# Patient Record
Sex: MALE | Race: WHITE | NOT HISPANIC OR LATINO
[De-identification: names, ages, dates, MRNs, and addresses within clinical notes are randomized per-mention and may not be internally consistent; named-entity substitution may affect disease eponyms.]

---

## 2022-04-05 PROBLEM — Z00.00 ENCOUNTER FOR PREVENTIVE HEALTH EXAMINATION: Status: ACTIVE | Noted: 2022-04-05

## 2022-04-13 ENCOUNTER — NON-APPOINTMENT (OUTPATIENT)
Age: 52
End: 2022-04-13

## 2022-04-18 ENCOUNTER — NON-APPOINTMENT (OUTPATIENT)
Age: 52
End: 2022-04-18

## 2022-04-20 ENCOUNTER — APPOINTMENT (OUTPATIENT)
Dept: COLORECTAL SURGERY | Facility: CLINIC | Age: 52
End: 2022-04-20
Payer: MEDICAID

## 2022-04-20 VITALS
HEART RATE: 94 BPM | SYSTOLIC BLOOD PRESSURE: 133 MMHG | TEMPERATURE: 97.9 F | DIASTOLIC BLOOD PRESSURE: 83 MMHG | WEIGHT: 279 LBS | HEIGHT: 72 IN | BODY MASS INDEX: 37.79 KG/M2

## 2022-04-20 PROCEDURE — 99204 OFFICE O/P NEW MOD 45 MIN: CPT | Mod: 25

## 2022-04-20 PROCEDURE — 46600 DIAGNOSTIC ANOSCOPY SPX: CPT

## 2022-04-20 NOTE — HISTORY OF PRESENT ILLNESS
[FreeTextEntry1] : 50 yo M presents for evaluation of anal fissure\par PMH HTN, h/o COVID infection c/b PE, treated w/ Eliquis, discontinued as of 4-5 months ago\par PSH Cholecystectomy\par \par h/o prolapsing hemorrhoids that used to reduce on own, however 2 months pain worsened during BMs and lasted hours after BMs. Pt seen by PCP who referred to CRS Dr. Tonie Lares at [facility] who started on Diltiazem 2% BID for the last 6 weeks w/ improvement in pain. Seen by Dr. Lares for follow up who recommended surgery to address hemorrhoids and fissure. Pt's friend recommended second opinion w/ this office prior to considering surgery\par \par Reports overall improvement in symptoms w/ reduced severity of pain after BMs. Reports occasional BRB noted on TP and paper\par Admits to worsening burning/itching which is worsened if hemorrhoids prolapse out. \par Not using any other topical creams. Denies prior h/o hemorrhoidal procedure\par Not performing sitz baths at present\par \par BH: daily to every other day due to fear of pain\par Denies constipation, straining or diarrhea\par Denies use of stool softeners or fiber supplements\par \par Last colonoscopy w/ Dr. Vanessa at [facility], otherwise normal, no specimens collected. Repeat in 10 years\par Denies FMH colorectal CA or IBD. Father w/ lung CA\par Denies ASA use. Possibly took Motrin for headache in the last 7 days\par \par

## 2022-04-20 NOTE — PHYSICAL EXAM
[Posterior] : posteriorly [Normal] : was normal [None] : there was no rectal mass  [de-identified] : Broad-based posterior fissure.  Partially epithelialized. [FreeTextEntry1] : Medical assistant was present for the entire exam.\par \par Anoscopy was performed for evaluation of the patients rectal bleeding  history .\par The risks, benefits and alternatives were reviewed.\par \par A lighted anoscope was passed into the anal canal and the entire anal mucosal surface was inspected..  \par The findings revealed large internal hemorrhoids.\par No masses or lesions were identified.\par \par

## 2022-04-20 NOTE — ASSESSMENT
[FreeTextEntry1] : I reviewed with the patient the findings on examination consistent with a posterior anal fissure and large internal hemorrhoids.  Overall his symptoms of post defecatory pain and burning are improving with conservative management–treatment of anal fissure.  I have therefore counseled him regarding the need for continued efforts to soften his stool.  There is potential opportunity to avoid surgery if his symptoms continue to improve and a period of additional 6 weeks of conservative management would be justified.\par \par In regard to his internal hemorrhoids–I have counseled him regarding the need for continued fiber supplementation and increase hydration.  If his fissure resolves with conservative management attempts at rubber band ligation of his internal hemorrhoids would be appropriate first choice.\par \par The risks and befits of the treatment plan were reviewed and outlined with the patient. The patient understands the associated risks of the involved treatment and wishes to proceed. All questions were answered and explained.\par \par

## 2023-01-29 ENCOUNTER — NON-APPOINTMENT (OUTPATIENT)
Age: 53
End: 2023-01-29

## 2023-01-30 ENCOUNTER — APPOINTMENT (OUTPATIENT)
Dept: COLORECTAL SURGERY | Facility: CLINIC | Age: 53
End: 2023-01-30
Payer: MEDICAID

## 2023-01-30 VITALS
HEIGHT: 72 IN | SYSTOLIC BLOOD PRESSURE: 148 MMHG | HEART RATE: 91 BPM | DIASTOLIC BLOOD PRESSURE: 95 MMHG | TEMPERATURE: 97.2 F | WEIGHT: 282 LBS | BODY MASS INDEX: 38.19 KG/M2

## 2023-01-30 PROCEDURE — 99214 OFFICE O/P EST MOD 30 MIN: CPT

## 2023-01-30 NOTE — ASSESSMENT
[FreeTextEntry1] : I had an extensive discussion with the patient (30 minutes) regarding the diagnosis of an anal fissure. The etiology is suspected to be related to a hypertonic sphincter as well as secondary direct anal trauma. The medical and surgical management options have been reviewed in detail including lubricant suppository therapy, stool softeners, fiber agents, and surgical anal dilatation. The risks, benefits and alternatives including bleeding infection, nonhealing wounds, and permanent leakage, seepage and incontinence have been detailed. All questions have been reviewed and answered. Appropriate literature has been shared with the patient.\par Patient wishes to proceed with surgery for his anal fissure–anal dilation.  All questions answered.\par \par Surgery scheduled

## 2023-01-30 NOTE — HISTORY OF PRESENT ILLNESS
[FreeTextEntry1] : 51 y/o M presents for follow up evaluation of anal fissure \par PMH HTN, h/o COVID infection c/b PE, treated w/ Eliquis, discontinued Dec 2021/Jan 2022\par PSH Cholecystectomy\par \par Seen for initial consultation 4/20/22, Second opinion for h/o prolapsing hemorrhoids that use to reduce on their own. However, patient had flare of pain after BMs. Patient seen by Dr. Tonie Lares at [facility] began pt on Diltiazem 2% BID. Symptoms improved and during follow up appt pt was recommended surgery to address hemorrhoids and fissure  \par \par Pt c/o burning/itching which worsens when hemorrhoids prolapse. On exam, anal fissure posteriorly, broad based posterior fissure. Partially epithelized. Sphincter tone was normal. No rectal tenderness, anoscopy revealed, large internal hemorrhoids. Supportive measures reviewed, and pt counseled on possible anal fissure symptomatology improvement ~ 6 weeks. Encouraged pt on bowl optimization habits to alleviate hemorrhoidal symptoms. \par \par c/o persistent symptoms w/ burning sensation after BMs, hemorrhoids prolapse w/ every BM which he engages muscles to "suck in hemorrhoids." Pt uses topical compound as needed for pain. Reports symptoms interfere w/ work schedule, he "schedules" BMs every other day in the afternoon to get through work day\par Rare BRB on TP when he wipes or may drip into the bowl, occurs approx every 1-2 months\par BH: every other day, occasional straining as he avoids BM. Admits to sitting on toilet for prolonged time and often reads emails\par Takes stool softener once daily\par Denies ASA/NSAID use\par \par Last colonoscopy w/ Dr. Vanessa at [facility], otherwise normal, no specimens collected. Repeat in 10 years\par Denies FMH colorectal CA or IBD. Father w/ lung CA

## 2023-03-15 ENCOUNTER — TRANSCRIPTION ENCOUNTER (OUTPATIENT)
Age: 53
End: 2023-03-15

## 2023-03-15 NOTE — ASU PATIENT PROFILE, ADULT - NSICDXPASTMEDICALHX_GEN_ALL_CORE_FT
PAST MEDICAL HISTORY:  High blood pressure     Pancreatitis     Pneumonia of both lungs due to Covid 19 with blood cloth 2020

## 2023-03-15 NOTE — ASU PATIENT PROFILE, ADULT - FALL HARM RISK - UNIVERSAL INTERVENTIONS
Bed in lowest position, wheels locked, appropriate side rails in place/Call bell, personal items and telephone in reach/Instruct patient to call for assistance before getting out of bed or chair/Non-slip footwear when patient is out of bed/[place] to call system/Physically safe environment - no spills, clutter or unnecessary equipment/Purposeful Proactive Rounding/Room/bathroom lighting operational, light cord in reach

## 2023-03-15 NOTE — ASU PATIENT PROFILE, ADULT - NS PREOP UNDERSTANDS INFO
No solid food or milk products after midnight, 03-16-23 Water, clear apple juice or Gatorade no later than 05:30 am DOS/ Photo ID and Insurance card/Comfortable clothing/Adult escort to take you home/yes

## 2023-03-16 ENCOUNTER — TRANSCRIPTION ENCOUNTER (OUTPATIENT)
Age: 53
End: 2023-03-16

## 2023-03-16 ENCOUNTER — OUTPATIENT (OUTPATIENT)
Dept: OUTPATIENT SERVICES | Facility: HOSPITAL | Age: 53
LOS: 1 days | Discharge: HOME | End: 2023-03-16
Payer: MEDICAID

## 2023-03-16 ENCOUNTER — APPOINTMENT (OUTPATIENT)
Dept: COLORECTAL SURGERY | Facility: HOSPITAL | Age: 53
End: 2023-03-16

## 2023-03-16 VITALS
RESPIRATION RATE: 15 BRPM | DIASTOLIC BLOOD PRESSURE: 80 MMHG | HEIGHT: 72 IN | SYSTOLIC BLOOD PRESSURE: 132 MMHG | TEMPERATURE: 97 F | WEIGHT: 273.81 LBS | OXYGEN SATURATION: 98 % | HEART RATE: 87 BPM

## 2023-03-16 VITALS — SYSTOLIC BLOOD PRESSURE: 99 MMHG | OXYGEN SATURATION: 97 % | DIASTOLIC BLOOD PRESSURE: 51 MMHG | HEART RATE: 63 BPM

## 2023-03-16 DIAGNOSIS — G89.18 OTHER ACUTE POSTPROCEDURAL PAIN: ICD-10-CM

## 2023-03-16 DIAGNOSIS — Z90.49 ACQUIRED ABSENCE OF OTHER SPECIFIED PARTS OF DIGESTIVE TRACT: Chronic | ICD-10-CM

## 2023-03-16 PROCEDURE — 45905 DILATION OF ANAL SPHINCTER: CPT | Mod: GC

## 2023-03-16 RX ORDER — DOCUSATE SODIUM 100 MG
1 CAPSULE ORAL
Qty: 14 | Refills: 0
Start: 2023-03-16 | End: 2023-03-29

## 2023-03-16 RX ORDER — SODIUM CHLORIDE 9 MG/ML
1000 INJECTION, SOLUTION INTRAVENOUS
Refills: 0 | Status: DISCONTINUED | OUTPATIENT
Start: 2023-03-16 | End: 2023-03-16

## 2023-03-16 RX ORDER — SODIUM CHLORIDE 9 MG/ML
1000 INJECTION, SOLUTION INTRAVENOUS ONCE
Refills: 0 | Status: DISCONTINUED | OUTPATIENT
Start: 2023-03-16 | End: 2023-03-16

## 2023-03-16 RX ORDER — ACETAMINOPHEN 500 MG
650 TABLET ORAL ONCE
Refills: 0 | Status: DISCONTINUED | OUTPATIENT
Start: 2023-03-16 | End: 2023-03-16

## 2023-03-16 RX ORDER — OXYCODONE AND ACETAMINOPHEN 5; 325 MG/1; MG/1
1 TABLET ORAL
Qty: 16 | Refills: 0
Start: 2023-03-16 | End: 2023-03-19

## 2023-03-16 NOTE — ASU DISCHARGE PLAN (ADULT/PEDIATRIC) - ASU DC SPECIAL INSTRUCTIONSFT
Please follow up with Dr. Padilla in 1 week, please call to schedule an appointment.   Please follow instructions given to you by Dr. Padilla and his office.    Activity: Please resume normal activities such as walking, climbing stairs, and other activities of daily living. Refrain from intense exercise for at least 1 week.     Diet: please continue with high fiber diet.     Bleeding: Expect some bleeding after surgery. Spotting, dripping into toilet bowl, staining on your dressing - these are all expected up to a week or two after surgery.     Dressings: please keep in place for 1 day or until after your first bowel movement, whichever comes first. A piece of surgicell (cotton strip) has been placed within the anus, it will dissolve on its own and helps stop postoperative bleeding.     Sitz baths: please continue with sitz baths in a warm tub to help with pain and discomfort. Each soak should be for 15-20 minutes in a warm bath. You may repeat as many times as necessary.      Bowel movements: Please take colace 100mg twice daily for 2 weeks to facilitate solid, consistent bowel movements.     Medications: Please resume home medications. Please continue to hold blood thinners for 48 hours after the surgery (ex: aspirin, plavix), you may resume after 2 days. Do not place anything in the anus.     New medications:  You have been sent the following prescriptions to your pharmacy:  1. Percocet 325mg-5mg every 6 hours as needed for severe pain. please do not take Tylenol while taking percocet as it contains Tylenol.   2. Colace 100mg - please take 1-2 times daily while taking percocet to prevent constipation.     General Discharge Instructions:  Please resume all regular home medications unless specifically advised not to take a particular medication. Also, please take any new medications as prescribed.  Please get plenty of rest, continue to ambulate several times per day, and drink adequate amounts of fluids. Avoid lifting weights greater than 5-10 lbs until you follow-up with your surgeon, who will instruct you further regarding activity restrictions.  Avoid driving or operating heavy machinery while taking pain medications.  Please follow-up with your surgeon and Primary Care Provider (PCP) as advised.    Incision Care:  *Please call your doctor or nurse practitioner if you have increased pain, swelling, redness, or drainage from the incision site.  *Avoid swimming and baths until your follow-up appointment.  *You may shower, and wash surgical incisions with a mild soap and warm water. Gently pat the area dry.    Warning Signs:  Please call your doctor or nurse practitioner if you experience the following:  *You experience new chest pain, pressure, squeezing or tightness.  *New or worsening cough, shortness of breath, or wheeze.  *If you are vomiting and cannot keep down fluids or your medications.  *You are getting dehydrated due to continued vomiting, diarrhea, or other reasons. Signs of dehydration include dry mouth, rapid heartbeat, or feeling dizzy or faint when standing.  *You see blood or dark/black material when you vomit or have a bowel movement.  *You experience burning when you urinate, have blood in your urine, or experience a discharge.  *Your pain is not improving within 8-12 hours or is not gone within 24 hours. Call or return immediately if your pain is getting worse, changes location, or moves to your chest or back.  *You have shaking chills, or fever greater than 101.5 degrees Fahrenheit or 38 degrees Celsius.  *Any change in your symptoms, or any new symptoms that concern you.

## 2023-03-16 NOTE — PRE-ANESTHESIA EVALUATION ADULT - NSANTHOSAYNRD_GEN_A_CORE
No. DAVY screening performed.  STOP BANG Legend: 0-2 = LOW Risk; 3-4 = INTERMEDIATE Risk; 5-8 = HIGH Risk

## 2023-03-16 NOTE — ASU DISCHARGE PLAN (ADULT/PEDIATRIC) - CARE PROVIDER_API CALL
Lamin Padilla)  ColonRectal Surgery; Surgery  [facility], [address]nd [place] [address]  Phone: [phone]  Fax: [phone]  Follow Up Time: 1 week

## 2023-03-16 NOTE — ASU DISCHARGE PLAN (ADULT/PEDIATRIC) - NS MD DC FALL RISK RISK
For information on Fall & Injury Prevention, visit: [email or web address] OR  [email or web address] OR  https://www.cdc.gov/steadi/patient.html

## 2023-03-16 NOTE — BRIEF OPERATIVE NOTE - OPERATION/FINDINGS
Patient prepped and draped in sterile fashion. Local anesthetic injected circum-anally. Exam performed under anesthesia, noting anal fissure in posterior midline, multiple right hemorrhoids. Deedee's dilators were introduced into the anal canal, increasing size with each placement. Bovie electrocautery used for hemostasis of fissure, adequate hemostasis achieved.

## 2023-03-20 ENCOUNTER — TRANSCRIPTION ENCOUNTER (OUTPATIENT)
Age: 53
End: 2023-03-20

## 2023-04-14 ENCOUNTER — APPOINTMENT (OUTPATIENT)
Dept: COLORECTAL SURGERY | Facility: CLINIC | Age: 53
End: 2023-04-14
Payer: MEDICAID

## 2023-04-14 VITALS
DIASTOLIC BLOOD PRESSURE: 82 MMHG | BODY MASS INDEX: 38.06 KG/M2 | TEMPERATURE: 98.2 F | HEIGHT: 72 IN | WEIGHT: 281 LBS | HEART RATE: 78 BPM | SYSTOLIC BLOOD PRESSURE: 138 MMHG

## 2023-04-14 DIAGNOSIS — K60.2 ANAL FISSURE, UNSPECIFIED: ICD-10-CM

## 2023-04-14 PROBLEM — K85.90 ACUTE PANCREATITIS WITHOUT NECROSIS OR INFECTION, UNSPECIFIED: Chronic | Status: ACTIVE | Noted: 2023-03-15

## 2023-04-14 PROBLEM — I10 ESSENTIAL (PRIMARY) HYPERTENSION: Chronic | Status: ACTIVE | Noted: 2023-03-15

## 2023-04-14 PROBLEM — J18.9 PNEUMONIA, UNSPECIFIED ORGANISM: Chronic | Status: ACTIVE | Noted: 2023-03-15

## 2023-04-14 PROCEDURE — 99213 OFFICE O/P EST LOW 20 MIN: CPT

## 2023-04-14 NOTE — HISTORY OF PRESENT ILLNESS
[FreeTextEntry1] : 53 y/o M presents for post operative evaluation of anal fissure \par PMH HTN, h/o COVID infection c/b PE, treated w/ Eliquis, discontinued Dec 2021/Jan 2022\par PSH Cholecystectomy\par \par Seen for initial consultation 4/20/22, c/o burning itching and symptoms worsen when hemorrhoids prolapse. Exam notable for anal fissure posteriorly, broad based posterior fissure. Partially epithelized. Sphincter tone was normal. On anoscopy, large internal hemorrhoids were appreciated. Trial of supportive therapy advised, however, given persistent symptoms patient wished to have anal fissure addressed. \par \par S/p Anoscopy, and anal dilation 3/16/23\par \par Patient presents for post operative evaluation\par \par Pt reports fissure symptoms resolved since procedure. Had one bloody BM immediately after surgery, but otherwise didn't have any pain or bleeding since. \par Reports yesterday and today had BRB on TP after BM\par Has been taking 2 stool softeners BID, has daily BM and no longer sitting for long periods.\par Admits to hemorrhoidal swelling after BMs that "suck in" after BM or may protrude out when walking. Denies itching or irritation. Has not used any topicals since surgery\par He is open to rubber band ligation therapy\par \par Denies ASA/NSAID use\par \par

## 2023-04-14 NOTE — ASSESSMENT
[FreeTextEntry1] : Patient with anal fissure–status post anal dilation.  Completely epithelialized.\par \par Patient with known large internal hemorrhoids.  Intermittent bright red blood per rectum without pain at this time.  Recommend stool softeners for 2 months and return for rubber band ligation.

## 2023-04-14 NOTE — PHYSICAL EXAM
[None] : no anal fissures seen [Skin Tags] : there were no residual hemorrhoidal skin tags seen [JVD] : no jugular venous distention  [Normal Heart Sounds] : normal heart sounds

## 2023-07-10 ENCOUNTER — APPOINTMENT (OUTPATIENT)
Dept: COLORECTAL SURGERY | Facility: CLINIC | Age: 53
End: 2023-07-10
Payer: MEDICAID

## 2023-07-10 VITALS
SYSTOLIC BLOOD PRESSURE: 134 MMHG | TEMPERATURE: 97.8 F | HEIGHT: 72 IN | DIASTOLIC BLOOD PRESSURE: 87 MMHG | HEART RATE: 87 BPM | BODY MASS INDEX: 38.19 KG/M2 | WEIGHT: 282 LBS

## 2023-07-10 DIAGNOSIS — L29.0 PRURITUS ANI: ICD-10-CM

## 2023-07-10 DIAGNOSIS — K64.8 OTHER HEMORRHOIDS: ICD-10-CM

## 2023-07-10 PROCEDURE — 99213 OFFICE O/P EST LOW 20 MIN: CPT | Mod: 25

## 2023-07-10 PROCEDURE — 46221 LIGATION OF HEMORRHOID(S): CPT

## 2023-07-10 RX ORDER — FLUOCINOLONE ACETONIDE 0.25 MG/G
0.03 OINTMENT TOPICAL
Qty: 1 | Refills: 1 | Status: DISCONTINUED | COMMUNITY
Start: 2023-07-10 | End: 2023-07-10

## 2023-07-10 RX ORDER — TRIAMCINOLONE ACETONIDE 0.25 MG/G
0.03 CREAM TOPICAL
Qty: 1 | Refills: 1 | Status: ACTIVE | COMMUNITY
Start: 2023-07-10 | End: 1900-01-01

## 2023-07-10 NOTE — PHYSICAL EXAM
[Excoriation] : excoriations [Manually Reducible] : a manually reducible (grade III) [Normal] : was normal [None] : there was no rectal mass  [JVD] : no jugular venous distention  [Normal Heart Sounds] : normal heart sounds [de-identified] : Moderate perianal erythema extending along the anterior gluteal cleft and anterior perineum.  Superficial fissuring of the anal margin skin. [de-identified] : Right anterior quadrant prolapsing internal hemorrhoid [FreeTextEntry1] : Medical assistant was present for the entire exam.\par \par Anoscopy was performed for evaluation of the patients rectal bleeding  history .\par The risks, benefits and alternatives were reviewed.\par \par A lighted anoscope was passed into the anal canal and the entire anal mucosal surface was inspected..  \par The findings revealed large internal hemorrhoids.\par No masses or lesions were identified.\par \par The risks and benefits of rubber band ligation were discussed with the patient including but not limited to bleeding, pain, infection, and the need for future procedures. The anoscope was placed and rubber band ligation was performed of the internal hemorrhoids–right posterior and right anterior quadrant with good result. The patient tolerated the procedure well. Appropriate postprocedure instructions were given to the patient.\par

## 2023-07-10 NOTE — HISTORY OF PRESENT ILLNESS
[FreeTextEntry1] : 54 y/o M presents for follow up evaluation of anal fissure, s/p anoscopy and anal dilation 3/16/23\par PMH HTN, h/o COVID infection c/b PE, treated w/ Eliquis, discontinued Dec 2021/Jan 2022\par PSH Cholecystectomy\par \par Seen for initial consultation 4/20/22, c/o burning itching and symptoms worsen when hemorrhoids prolapse. Exam notable for anal fissure posteriorly, broad based posterior fissure. Partially epithelized. Sphincter tone was normal. On anoscopy, large internal hemorrhoids were appreciated. Trial of supportive therapy advised, however, given persistent symptoms patient wished to have anal fissure addressed. \par \par S/p Anoscopy, and anal dilation 3/16/23\par \par Most recent office visit 4/14/23, patient reports fissure symptoms resolved since procedure. Admits to hemorrhoidal swelling after BM that "suck in" after BM or may protrude out when walking. External hemorrhoid, there were no residual hemorrhoidal skin tags appreciated. Intermittent BRB per rectum without pain at this time, pt recommended stool softeners for 2 months and return for possible rubber band ligation. \par \par Returns today with complaints of itching.  In addition to complaints of persistent hemorrhoidal protrusion that requires spontaneous reduction.  Bowel movements regular.  Currently using stool softeners

## (undated) DEVICE — VENODYNE/SCD SLEEVE CALF MEDIUM

## (undated) DEVICE — SPECIMEN CONTAINER 4OZ

## (undated) DEVICE — TAPE SILK 3"

## (undated) DEVICE — ELCTR STRYKER NEPTUNE BLADE COATED, INSULATED 70MM

## (undated) DEVICE — DRSG TELFA 3 X 8

## (undated) DEVICE — GOWN ROYAL SILK XL

## (undated) DEVICE — SYR ASEPTO

## (undated) DEVICE — DRSG CURITY GAUZE SPONGE 4 X 8" 12-PLY NON-STERILE

## (undated) DEVICE — POSITIONER STRAP KNEE & BODY 3X60" DISP

## (undated) DEVICE — WARMING BLANKET UPPER ADULT

## (undated) DEVICE — GLV 8 PROTEXIS (WHITE)

## (undated) DEVICE — PACK COLO RECTAL

## (undated) DEVICE — ELCTR PENCIL SMOKE EVACUATOR COATED PUSH BUTTON 70MM

## (undated) DEVICE — LUBRICATING JELLY ONESHOT 1.25OZ

## (undated) DEVICE — GLV 7.5 PROTEXIS (WHITE)

## (undated) DEVICE — POSITIONER FOAM EGG CRATE ULNAR 2PCS (PINK)

## (undated) DEVICE — NDL HYPO SAFE 22G X 1.5" (BLACK)